# Patient Record
Sex: MALE | Race: WHITE | URBAN - METROPOLITAN AREA
[De-identification: names, ages, dates, MRNs, and addresses within clinical notes are randomized per-mention and may not be internally consistent; named-entity substitution may affect disease eponyms.]

---

## 2018-11-20 ENCOUNTER — HISTORICAL (OUTPATIENT)
Dept: ADMINISTRATIVE | Facility: HOSPITAL | Age: 18
End: 2018-11-20

## 2018-11-20 LAB
ABS NEUT (OLG): 5.7 X10(3)/MCL (ref 2.1–9.2)
ERYTHROCYTE [DISTWIDTH] IN BLOOD BY AUTOMATED COUNT: 13.5 % (ref 11.5–17)
HCT VFR BLD AUTO: 44.7 % (ref 42–52)
HGB BLD-MCNC: 14.2 GM/DL (ref 14–18)
LYMPHOCYTES # BLD AUTO: 2.1 X10(3)/MCL (ref 0.6–3.4)
LYMPHOCYTES NFR BLD AUTO: 22.9 % (ref 13–40)
MCH RBC QN AUTO: 30.1 PG (ref 27–31.2)
MCHC RBC AUTO-ENTMCNC: 32 GM/DL (ref 32–36)
MCV RBC AUTO: 95 FL (ref 80–94)
MONOCYTES # BLD AUTO: 1.3 X10(3)/MCL (ref 0.1–1.3)
MONOCYTES NFR BLD AUTO: 14.8 % (ref 0.1–24)
NEUTROPHILS NFR BLD AUTO: 62.3 % (ref 47–80)
PLATELET # BLD AUTO: 169 X10(3)/MCL (ref 130–400)
PMV BLD AUTO: 10.6 FL (ref 9.4–12.4)
RBC # BLD AUTO: 4.71 X10(6)/MCL (ref 4.7–6.1)
WBC # SPEC AUTO: 9.1 X10(3)/MCL (ref 4.5–11.5)

## 2022-04-10 ENCOUNTER — HISTORICAL (OUTPATIENT)
Dept: ADMINISTRATIVE | Facility: HOSPITAL | Age: 22
End: 2022-04-10

## 2022-04-29 VITALS
SYSTOLIC BLOOD PRESSURE: 124 MMHG | HEIGHT: 73 IN | DIASTOLIC BLOOD PRESSURE: 68 MMHG | BODY MASS INDEX: 23.09 KG/M2 | WEIGHT: 174.19 LBS

## 2022-05-02 NOTE — HISTORICAL [facility] CERNER
This is a historical note converted from Beto. Formatting and pictures may have been removed.  Please reference Beto for original formatting and attached multimedia. Chief Complaint  C/O SEVERE SORE THROAT STARTYED THIS PAST WEEKEND. HAS HAD ONE OFF AND ON SINCE SUMMER TIME AND IT GETS WORSE AND WORSE EVERY TIME. HAD FEVER THIS AM.  History of Present Illness  Patient is here today with complaints of a horrible sore throat for the past 4 days.? He went to?an on campus?clinic?Saturday?where he?was placed on amoxicillin 875 twice daily.? He refused to be swabbed at that visit because of a bad gag reflex. ?Since that time he is continued to have a horrible sore throat.. ?His mother?was diagnosed with?a viral form of tonsillitis several weeks ago.? Patient reports?this is?about his third episode?of?pharyngitis/tonsillitis in the last year.  Review of Systems  GENERAL:?+?fever,??nochills  HEENT:??+sore throat,?no?nasal congestion,_rhinorrhea,??nosinus pressure,??noear pain,?tenderlymphadenopathy  CHEST:??no?cough,?no?wheezing,?no?sob,?_sputum production  Physical Exam  Vitals & Measurements  T:?36.8? ?C (Oral)? HR:?64(Peripheral)? BP:?124/68?  HT:?185.4?cm? HT:?185.4?cm? WT:?79?kg? WT:?79?kg? BMI:?22.98?  GENERAL:?Moderate discomfort  HEENT:? mouth clear, throat ?with tonsillar exudate?bilateral, nares ?within normal limits, tympanic membranes ?wnl bilaterally, ?tender L?and R?cervical lymphadenopathy, no uvula deviation  CHEST:?CTA bilaterally  CARDIAC: RRR no murmurs audible  Assessment/Plan  Tonsillitis?J03.90  Monospot negative, CBC( WBC-9.1),?strep swab +.??????? ?Celestone 2 cc IM,?D/c amoxicillin?? begin ?Omnicef 300 mg 1 po bid x 10 days, Norco 7.5/325 #20 q 4-6 hrs prn pain, increase fluids  Ordered:  betamethasone, 12 mg, IM, Once, first dose 11/20/18 16:00:00 CST, stop date 11/20/18 16:00:00 CST  Office/Outpatient Visit Level 3 New 00636 PC, Tonsillitis, [facility], 11/20/18 15:09:00  CST  ?  Orders:  acetaminophen-HYDROcodone, 1 tab(s), Oral, q6hr, # 20 tab(s), 0 Refill(s), Pharmacy: [id number]  cefdinir, 300 mg = 1 cap(s), Oral, q12hr, X 10 day(s), # 20 cap(s), 0 Refill(s), Pharmacy: [id number]  Clinic Follow-up PRN, 11/20/18 15:10:00 CST, [facility], Future Order   Problem List/Past Medical History  Ongoing  No qualifying data  Historical  No qualifying data  Medications  amoxicillin 875 mg oral tablet, 875 mg= 1 tab(s), Oral, q12hr  betamethasone, 12 mg, IM, Once  Norco 7.5 mg-325 mg oral tablet, 1 tab(s), Oral, q6hr  Omnicef 300 mg oral capsule, 300 mg= 1 cap(s), Oral, q12hr  Allergies  No Known Medication Allergies  Social History  Alcohol  Never, 11/20/2018  Tobacco  Never smoker, N/A, 11/20/2018  Family History  Healthy adult: Mother and Father.  Health Maintenance  Health Maintenance  ???Pending?(in the next year)  ??? ??Due?  ??? ? ? ?ADL Screening due??11/20/18??and every 1??year(s)  ??? ? ? ?Alcohol Misuse Screening due??11/20/18??and every 1??year(s)  ??? ? ? ?Tetanus Vaccine due??11/20/18??and every 10??year(s)  ???Satisfied?(in the past 1 year)  ??? ??Satisfied?  ??? ? ? ?Blood Pressure Screening on??11/20/18.??Satisfied by Amena Barber  ??? ? ? ?Body Mass Index Check on??11/20/18.??Satisfied by Amena Barber  ??? ? ? ?Influenza Vaccine on??11/20/18.??Satisfied by Amena Barber  ??? ? ? ?Obesity Screening on??11/20/18.??Satisfied by Amena Barber  ?  ?